# Patient Record
Sex: FEMALE | Race: OTHER | HISPANIC OR LATINO | URBAN - METROPOLITAN AREA
[De-identification: names, ages, dates, MRNs, and addresses within clinical notes are randomized per-mention and may not be internally consistent; named-entity substitution may affect disease eponyms.]

---
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
## 2021-07-19 NOTE — OB PROVIDER TRIAGE NOTE - NS_PHYSICALALLNEG_OBGYN_ALL_OB
Unable to offer due to clinical condition All other review of systems negative, except as noted in HPI

## 2021-09-14 NOTE — OB PROVIDER H&P - PROBLEM SELECTOR PROBLEM 1
Awaiting Yamileth's from Mount Ascutney Labor and delivery, indication for care

## 2023-04-05 NOTE — OB PROVIDER IHI INDUCTION/AUGMENTATION NOTE - NSCHECKLIST_OBGYN_ALL_OB_CAL
Pt in the office to  HST monitor. Pt was educated on how to use equipment properly and instructed to wear for 2 nights and to return on Friday. Pt states she understood. 5

## 2024-02-25 NOTE — DISCHARGE NOTE OB - PHYSICIAN SECTION COMPLETE
Informed Consent for Blood Component Transfusion Note    I have discussed with the patient the rationale for blood component transfusion; its benefits in treating or preventing fatigue, organ damage, or death; and its risk which includes mild transfusion reactions, rare risk of blood borne infection, or more serious but rare reactions. I have discussed the alternatives to transfusion, including the risk and consequences of not receiving transfusion. The patient had an opportunity to ask questions and had agreed to proceed with transfusion of blood components.    Electronically signed by Pk Alas MD on 2/25/24 at 0610 AM EST  
Yes

## 2024-03-27 NOTE — ED PEDIATRIC NURSE REASSESSMENT NOTE - NS ED NURSE REASSESS COMMENT FT2
Pt returned from US, resting, parents at bedside, PO contrast started at 0635.
Quality 226: Preventive Care And Screening: Tobacco Use: Screening And Cessation Intervention: Patient screened for tobacco use and is an ex/non-smoker
Detail Level: Detailed
Quality 110: Preventive Care And Screening: Influenza Immunization: Influenza Immunization not Administered because Patient Refused.
PAST MEDICAL HISTORY:  Afib     Anxiety     Asthma     Emphysema/COPD     HLD (hyperlipidemia)     HTN (hypertension)     Hypertension, unspecified type

## 2024-07-01 ENCOUNTER — OFFICE (OUTPATIENT)
Dept: URBAN - METROPOLITAN AREA CLINIC 94 | Facility: CLINIC | Age: 24
Setting detail: OPHTHALMOLOGY
End: 2024-07-01
Payer: COMMERCIAL

## 2024-07-01 DIAGNOSIS — H16.223: ICD-10-CM

## 2024-07-01 PROCEDURE — 92014 COMPRE OPH EXAM EST PT 1/>: CPT | Performed by: PHYSICIAN ASSISTANT

## 2024-07-01 ASSESSMENT — CONFRONTATIONAL VISUAL FIELD TEST (CVF)
OS_FINDINGS: FULL
OD_FINDINGS: FULL

## 2024-12-09 ENCOUNTER — APPOINTMENT (OUTPATIENT)
Dept: MATERNAL FETAL MEDICINE | Facility: CLINIC | Age: 24
End: 2024-12-09

## 2024-12-13 ENCOUNTER — APPOINTMENT (OUTPATIENT)
Dept: MATERNAL FETAL MEDICINE | Facility: CLINIC | Age: 24
End: 2024-12-13
Payer: COMMERCIAL

## 2024-12-13 ENCOUNTER — ASOB RESULT (OUTPATIENT)
Age: 24
End: 2024-12-13

## 2024-12-13 PROCEDURE — 99442: CPT | Mod: 93

## 2024-12-20 ENCOUNTER — NON-APPOINTMENT (OUTPATIENT)
Age: 24
End: 2024-12-20

## 2025-01-13 ENCOUNTER — NON-APPOINTMENT (OUTPATIENT)
Age: 25
End: 2025-01-13

## 2025-08-27 ENCOUNTER — OFFICE (OUTPATIENT)
Dept: URBAN - METROPOLITAN AREA CLINIC 94 | Facility: CLINIC | Age: 25
Setting detail: OPHTHALMOLOGY
End: 2025-08-27
Payer: COMMERCIAL

## 2025-08-27 DIAGNOSIS — H16.223: ICD-10-CM

## 2025-08-27 PROCEDURE — 92014 COMPRE OPH EXAM EST PT 1/>: CPT | Performed by: OPHTHALMOLOGY

## 2025-08-27 ASSESSMENT — REFRACTION_AUTOREFRACTION
OS_SPHERE: -1.50
OS_AXIS: 175
OD_SPHERE: -1.50
OD_CYLINDER: -0.75
OS_CYLINDER: -0.50
OD_AXIS: 005

## 2025-08-27 ASSESSMENT — REFRACTION_CURRENTRX
OS_SPHERE: -0.25
OS_AXIS: 178
OS_OVR_VA: 20/
OS_CYLINDER: -1.00
OD_VPRISM_DIRECTION: SV
OD_OVR_VA: 20/
OD_OVR_VA: 20/
OD_SPHERE: -1.50
OS_SPHERE: -0.25
OD_SPHERE: -1.50
OD_CYLINDER: -0.75
OS_CYLINDER: -1.00
OD_CYLINDER: -0.75
OD_AXIS: 175
OS_OVR_VA: 20/
OS_AXIS: 175
OS_VPRISM_DIRECTION: SV
OD_AXIS: 175

## 2025-08-27 ASSESSMENT — REFRACTION_MANIFEST
OS_VA1: 20/25
OS_VA1: 20/20
OD_CYLINDER: -0.75
OD_SPHERE: -1.50
OD_VA1: 20/20
OD_AXIS: 180
OS_AXIS: 180
OD_SPHERE: -1.50
OD_AXIS: 180
OD_VA1: 20/25
OS_CYLINDER: -0.50
OU_VA: 20/20
OS_SPHERE: -0.25
OS_AXIS: 175
OS_CYLINDER: -1.00
OD_CYLINDER: -0.75
OS_SPHERE: -1.25

## 2025-08-27 ASSESSMENT — PACHYMETRY
OD_CT_UM: 479
OS_CT_UM: 485
OS_CT_CORRECTION: 4
OD_CT_CORRECTION: 5

## 2025-08-27 ASSESSMENT — KERATOMETRY
OS_K2POWER_DIOPTERS: 45.00
OD_AXISANGLE_DEGREES: 090
OD_K2POWER_DIOPTERS: 45.00
OS_AXISANGLE_DEGREES: 090
OS_K1POWER_DIOPTERS: 43.50
OD_K1POWER_DIOPTERS: 43.00

## 2025-08-27 ASSESSMENT — TONOMETRY
OD_IOP_MMHG: 15
OS_IOP_MMHG: 12

## 2025-08-27 ASSESSMENT — SUPERFICIAL PUNCTATE KERATITIS (SPK)
OD_SPK: T
OS_SPK: T

## 2025-08-27 ASSESSMENT — VISUAL ACUITY
OS_BCVA: 20/25
OD_BCVA: 20/30

## 2025-08-27 ASSESSMENT — CONFRONTATIONAL VISUAL FIELD TEST (CVF)
OD_FINDINGS: FULL
OS_FINDINGS: FULL